# Patient Record
Sex: MALE | Race: WHITE | NOT HISPANIC OR LATINO | URBAN - METROPOLITAN AREA
[De-identification: names, ages, dates, MRNs, and addresses within clinical notes are randomized per-mention and may not be internally consistent; named-entity substitution may affect disease eponyms.]

---

## 2017-03-21 ENCOUNTER — EMERGENCY (EMERGENCY)
Facility: HOSPITAL | Age: 52
LOS: 1 days | End: 2017-03-21
Attending: EMERGENCY MEDICINE | Admitting: EMERGENCY MEDICINE
Payer: COMMERCIAL

## 2017-03-21 VITALS
RESPIRATION RATE: 16 BRPM | HEIGHT: 75 IN | OXYGEN SATURATION: 98 % | WEIGHT: 315 LBS | DIASTOLIC BLOOD PRESSURE: 100 MMHG | TEMPERATURE: 98 F | SYSTOLIC BLOOD PRESSURE: 149 MMHG | HEART RATE: 92 BPM

## 2017-03-21 VITALS
OXYGEN SATURATION: 97 % | TEMPERATURE: 98 F | DIASTOLIC BLOOD PRESSURE: 96 MMHG | RESPIRATION RATE: 15 BRPM | HEART RATE: 88 BPM | SYSTOLIC BLOOD PRESSURE: 143 MMHG

## 2017-03-21 PROCEDURE — 99284 EMERGENCY DEPT VISIT MOD MDM: CPT | Mod: 25

## 2017-03-21 PROCEDURE — 70450 CT HEAD/BRAIN W/O DYE: CPT

## 2017-03-21 PROCEDURE — 70450 CT HEAD/BRAIN W/O DYE: CPT | Mod: 26

## 2017-03-21 PROCEDURE — 99284 EMERGENCY DEPT VISIT MOD MDM: CPT

## 2017-03-21 RX ORDER — ACETAMINOPHEN 500 MG
650 TABLET ORAL ONCE
Qty: 0 | Refills: 0 | Status: COMPLETED | OUTPATIENT
Start: 2017-03-21 | End: 2017-03-21

## 2017-03-21 RX ADMIN — Medication 650 MILLIGRAM(S): at 01:43

## 2017-03-21 NOTE — ED PROVIDER NOTE - LEFT FACE
TENDERNESS TO PALPATION/+soft tissue swelling to top of head, no step off, no laceration or abrasion

## 2017-03-21 NOTE — ED PROVIDER NOTE - OBJECTIVE STATEMENT
52yo male who presenst with head injury at work. pt was bending down to  a box and stood up and hit his head, no LOC, pt states he "saw stars" no dizziness or vomiting, +headache and pain, pt did not take anything for the pain

## 2017-03-21 NOTE — ED ADULT NURSE NOTE - CHPI ED SYMPTOMS NEG
no change in level of consciousness/no vomiting/no syncope/no confusion/no dizziness/no nausea/no weakness/no loss of consciousness/no blurred vision

## 2017-03-21 NOTE — ED PROVIDER NOTE - CHPI ED SYMPTOMS NEG
no vomiting/no weakness/no confusion/no numbness/no blurred vision/no change in level of consciousness/no loss of consciousness

## 2018-02-16 ENCOUNTER — EMERGENCY (EMERGENCY)
Facility: HOSPITAL | Age: 53
LOS: 1 days | Discharge: HOME | End: 2018-02-16
Attending: EMERGENCY MEDICINE | Admitting: EMERGENCY MEDICINE
Payer: COMMERCIAL

## 2018-02-16 VITALS
WEIGHT: 315 LBS | HEART RATE: 90 BPM | DIASTOLIC BLOOD PRESSURE: 94 MMHG | HEIGHT: 74 IN | OXYGEN SATURATION: 98 % | SYSTOLIC BLOOD PRESSURE: 136 MMHG | RESPIRATION RATE: 16 BRPM | TEMPERATURE: 98 F

## 2018-02-16 VITALS
SYSTOLIC BLOOD PRESSURE: 135 MMHG | RESPIRATION RATE: 17 BRPM | HEART RATE: 97 BPM | TEMPERATURE: 98 F | DIASTOLIC BLOOD PRESSURE: 88 MMHG | OXYGEN SATURATION: 95 %

## 2018-02-16 LAB
FLUAV SPEC QL CULT: NEGATIVE — SIGNIFICANT CHANGE UP
FLUBV AG SPEC QL IA: NEGATIVE — SIGNIFICANT CHANGE UP

## 2018-02-16 PROCEDURE — 87633 RESP VIRUS 12-25 TARGETS: CPT

## 2018-02-16 PROCEDURE — 71046 X-RAY EXAM CHEST 2 VIEWS: CPT | Mod: 26

## 2018-02-16 PROCEDURE — 99284 EMERGENCY DEPT VISIT MOD MDM: CPT

## 2018-02-16 PROCEDURE — 99283 EMERGENCY DEPT VISIT LOW MDM: CPT | Mod: 25

## 2018-02-16 PROCEDURE — 71045 X-RAY EXAM CHEST 1 VIEW: CPT | Mod: 26

## 2018-02-16 PROCEDURE — 71045 X-RAY EXAM CHEST 1 VIEW: CPT

## 2018-02-16 PROCEDURE — 71046 X-RAY EXAM CHEST 2 VIEWS: CPT

## 2018-02-16 PROCEDURE — 87400 INFLUENZA A/B EACH AG IA: CPT

## 2018-02-16 RX ADMIN — Medication 75 MILLIGRAM(S): at 01:45

## 2018-02-16 NOTE — ED ADULT NURSE NOTE - OBJECTIVE STATEMENT
Patient presents to ED with URI like symptoms that began today. As per patient has nasal congestion, cough and periods of diaphoresis. At present denies any pain but reports chest congestion. Denies nausea, vomiting or diarrhea.

## 2018-02-16 NOTE — ED PROVIDER NOTE - MEDICAL DECISION MAKING DETAILS
52 y.o. smoker c/o 2d URI symptoms, no fever, started getting sweaty tonight at work, more congestion, prelim flu negative, final pending, cxr appears negative, official read pending, starting tamiflu now, pt to f/u final results today

## 2018-02-16 NOTE — ED PROVIDER NOTE - OBJECTIVE STATEMENT
52 y.o. M noted cough, congestion since about 1.5d ago 52 y.o. M noted cough, congestion since about 1.5d ago, no fever, + cough, no sob, here in hospital working tonight, starting to sweat, thinks has the flu

## 2018-02-16 NOTE — ED PROVIDER NOTE - ENMT, MLM
Airway patent, Nasal mucosa clear. Mouth with normal mucosa. Throat has no vesicles, no oropharyngeal exudates and uvula is midline. no sinus ttp

## 2020-04-25 ENCOUNTER — MESSAGE (OUTPATIENT)
Age: 55
End: 2020-04-25

## 2020-05-15 ENCOUNTER — APPOINTMENT (OUTPATIENT)
Dept: DISASTER EMERGENCY | Facility: HOSPITAL | Age: 55
End: 2020-05-15

## 2020-05-16 LAB
SARS-COV-2 IGG SERPL IA-ACNC: 0.1 INDEX
SARS-COV-2 IGG SERPL QL IA: NEGATIVE

## 2020-09-05 ENCOUNTER — EMERGENCY (EMERGENCY)
Facility: HOSPITAL | Age: 55
LOS: 1 days | Discharge: HOME | End: 2020-09-05
Attending: EMERGENCY MEDICINE
Payer: COMMERCIAL

## 2020-09-05 VITALS
TEMPERATURE: 98 F | HEART RATE: 93 BPM | SYSTOLIC BLOOD PRESSURE: 138 MMHG | DIASTOLIC BLOOD PRESSURE: 92 MMHG | OXYGEN SATURATION: 94 % | RESPIRATION RATE: 18 BRPM

## 2020-09-05 PROCEDURE — 73030 X-RAY EXAM OF SHOULDER: CPT | Mod: 26,RT

## 2020-09-05 PROCEDURE — 99284 EMERGENCY DEPT VISIT MOD MDM: CPT

## 2020-09-05 PROCEDURE — 73060 X-RAY EXAM OF HUMERUS: CPT

## 2020-09-05 PROCEDURE — 73030 X-RAY EXAM OF SHOULDER: CPT

## 2020-09-05 PROCEDURE — 73060 X-RAY EXAM OF HUMERUS: CPT | Mod: 26,RT

## 2020-09-05 RX ORDER — IBUPROFEN 200 MG
600 TABLET ORAL ONCE
Refills: 0 | Status: COMPLETED | OUTPATIENT
Start: 2020-09-05 | End: 2020-09-05

## 2020-09-05 RX ORDER — DIAZEPAM 5 MG
5 TABLET ORAL ONCE
Refills: 0 | Status: DISCONTINUED | OUTPATIENT
Start: 2020-09-05 | End: 2020-09-05

## 2020-09-05 RX ORDER — METHOCARBAMOL 500 MG/1
1 TABLET, FILM COATED ORAL
Qty: 6 | Refills: 0
Start: 2020-09-05 | End: 2020-09-07

## 2020-09-05 RX ADMIN — Medication 5 MILLIGRAM(S): at 18:20

## 2020-09-05 RX ADMIN — Medication 600 MILLIGRAM(S): at 18:19

## 2020-09-05 NOTE — ED PROVIDER NOTE - NSFOLLOWUPINSTRUCTIONS_ED_ALL_ED_FT
Follow up with an orthopedist within one week.  Keep splint on. Continue to apply ice.  Take medications as prescribed. Do not drive while taking muscle relaxer. Can continue to take Tylenol and Motrin as directed for pain.   Return immediately for any worsening or concerning symptoms.

## 2020-09-05 NOTE — ED PROVIDER NOTE - OBJECTIVE STATEMENT
55 yr old male, pmhx of DM, presents with right shoulder pain just prior to arrival. Patient s/p mechanical trip and fall and landed on right shoulder. Patient did not hit head, no LOC. Patient states only pain to right shoulder. No neck or back pain. No numbness/tingling.

## 2020-09-05 NOTE — ED PROVIDER NOTE - PATIENT PORTAL LINK FT
You can access the FollowMyHealth Patient Portal offered by  by registering at the following website: [email or web address]. By joining [facility]’s FollowMyHealth portal, you will also be able to view your health information using other applications (apps) compatible with our system.

## 2020-09-05 NOTE — ED PROVIDER NOTE - CARE PROVIDER_API CALL
Agus Damian (DO)  Orthopedics  [address] [place] [id number]  Phone: [phone]  Fax: [phone]  Follow Up Time: 4-6 Days

## 2020-09-05 NOTE — ED PROVIDER NOTE - ATTENDING CONTRIBUTION TO CARE
Shayne WHITMAN- 56 Y/O M p/w fall after tripping on a tree branch at home and fell on his rt shoulder, no hit to the head or loc. Pt is unable to move his rt shoulder much and brought in by ems in sling. Pt is rt handed    pt is alert,  obese, large built, well appearing male, s1s2 normaql reg, b/l clear breath sounds, abd soft, nt, nd, normal bowel sounds,  rt shoulder swollen, diffusely tender but no deformity, normal axillary sensation, motion at elbow and wrist, skin warm, dry, good turgor    plan to r/o shoulder fx , control pain and reassess

## 2020-09-05 NOTE — ED PROVIDER NOTE - MUSCULOSKELETAL MINIMAL EXAM
right shoulder with decrease in motion overhead, FROM  to right elbow and wrist, good cap refill, intact sensation throughout

## 2020-12-31 ENCOUNTER — APPOINTMENT (OUTPATIENT)
Dept: ORTHOPEDIC SURGERY | Facility: CLINIC | Age: 55
End: 2020-12-31
Payer: COMMERCIAL

## 2020-12-31 VITALS
WEIGHT: 315 LBS | BODY MASS INDEX: 40.43 KG/M2 | HEIGHT: 74 IN | TEMPERATURE: 96.1 F | DIASTOLIC BLOOD PRESSURE: 94 MMHG | HEART RATE: 86 BPM | SYSTOLIC BLOOD PRESSURE: 144 MMHG

## 2020-12-31 PROCEDURE — 99203 OFFICE O/P NEW LOW 30 MIN: CPT

## 2020-12-31 PROCEDURE — 99072 ADDL SUPL MATRL&STAF TM PHE: CPT

## 2020-12-31 NOTE — HISTORY OF PRESENT ILLNESS
[de-identified] : Nixon is a pleasant left-hand-dominant 55-year-old obese male who presents to the office today complaining of right shoulder pain and weakness.  He states that he had an injury in September 2020 after a fall and has had pain and weakness in his shoulder ever since.  He sought treatment with another orthopedist who recommended surgical intervention for a full-thickness rotator cuff injury.  He was scheduled for surgery twice but had canceled on both times related to clearance issues as well as his body habitus.  He comes in today seeking a second opinion.  He has had difficulty working because of the pain.  He does housekeeping at [employer] and is unable to keep up with his duties because of this pain.  Takes anti-inflammatories as needed but they are not helpful.  The patient denies any fevers, chills, sweats, recent illnesses, numbness, tingling, or pain elsewhere at this time.

## 2020-12-31 NOTE — DISCUSSION/SUMMARY
[de-identified] : Assessment: 55-year-old male with right shoulder pain secondary to rotator cuff injury involving his supraspinatus, infraspinatus, and subscapularis tendons, biceps tendinopathy, subacromial impingement, AC joint arthrosis, and synovitis\par \par Plan: I had a long discussion with the patient today regarding the nature of their diagnosis and treatment plan.  We discussed the risks and benefits of no treatment as well as nonoperative and operative treatments.  I reviewed the patient's imaging with them today in the office which demonstrates full-thickness tearing of the supraspinatus, tendinosis of the infraspinatus and subscapularis, biceps tendinopathy, AC joint arthrosis, subacromial impingement, and synovitis. The patient has failed conservative treatment measures including rest, ice, heat, anti-inflammatory medications, activity modifications, and home exercises/physical therapy.  They continue to have significant pain and functional limitations which interfere with their daily activities. At this time they would like to consider operative intervention as a more definitive treatment option for their symptoms.  Surgical treatment would include a right shoulder arthroscopic rotator cuff repair with subacromial decompression, possible distal clavicle excision, synovectomy, and possible biceps tenotomy.  The risks and benefits of surgery were discussed in detail.  The benefits include improved shoulder pain and function.  The risks include but are not limited to infection, blood loss, blood clots, neurovascular injury, stiffness/loss of range of motion, fracture, failure of repair, persistent pain, anesthesia related complications including paralysis and death, and the need for further surgery in the future.  I explained to the patient that they will be nonweightbearing of their operative extremity in a shoulder immobilizer for a minimum of 6 weeks postoperatively.  They will not be permitted to drive while wearing the sling and/or while taking narcotic pain medications.  I also informed the patient that they will need to actively participate in physical therapy 2-3 days a week for a minimum of 4 to 6 months postoperatively in order to optimize their surgical outcome.  I expect most patients to make a full recovery at an average of 6 months postoperatively, but some patients take longer to recover and can continue to make improvements up to 1 year after surgery.  The patient verbalizes their understanding of all risks and comprehends the post-operative course and plan of care. They would like to proceed with surgery in the near future and will speak with my surgical coordinator regarding a date and time for the procedure.  Because of the patient's BMI this procedure will likely need to be done at the hospital.  We will also need to request a larger beanbag or lateral positioner given his size.  All questions were answered to her satisfaction.

## 2020-12-31 NOTE — REASON FOR VISIT
[Initial Visit] : an initial visit for [Shoulder Pain] : shoulder pain [FreeTextEntry2] : Right shoulder pain

## 2020-12-31 NOTE — PHYSICAL EXAM
[de-identified] : General:\par Awake, alert, no acute distress, Patient was cooperative and appropriate during the examination.\par \par The patient is morbidly obese for height and age.\par \par Walks without an antalgic gait.\par \par Full, painless range of motion of the neck and back.\par \par Exam of the bilateral lower extremities is intact and symmetric with regards to dermatologic, vascular, and neurologic exam. Bilateral lower extremity sensation is grossly intact to light touch in the DP/SP/T/S/S nerve distributions. Intact DF/PF/EHL. BIlateral lower extremities warm and well-perfused with brisk capillary refill.\par \par \par Pulmonary:\par Regular, nonlabored breathing\par \par Abdomen:\par Soft, nontender, nondistended.\par \par Lymphatic:\par No evidence of inguinal lymphadenopathy\par \par Right shoulder Exam:\par Physical exam of the shoulder demonstrates normal skin without signs of skin changes or abnormalities. No erythema, warmth, or joint effusion appreciated.  \par \par Sensation intact light touch C5-T1\par Palpable radial pulse\par Radial/ulnar/median/axillary/musculocutaneous/AIN/PIN nerves grossly intact\par \par Range of motion:\par Forward Flexion: 160 limited by pain\par Abduction: 150\par External Rotation: 45\par Internal Rotation: Beltline limited by pain\par \par Palpation:\par Not tender to palpation over the glenohumeral joint\par Exquisitely tender palpation over the rotator cuff insertion on the greater tuberosity\par Moderately tender to palpation over the AC joint\par Moderately tender to palpation of the biceps tendon/bicipital groove\par \par Strength testing:\par Supraspinatus: 4/5\par Infraspinatus: 4/5\par Subscapularis: 4+/5\par \par Special test:\par Empty can test positive\par Gonzalez impingement test positive\par Speeds test positive\par Hamilton's test negative\par Lift-off test negative\par Bell-press test negative\par Cross-arm adduction test equivocal limited by global shoulder pain\par \par  [de-identified] : MRI of the right shoulder [facility] from September 2020 was reviewed with the patient today in the office.  Patient has a full-thickness retracted tear of the supraspinatus tendon near the critical zone.  There is subscapularis and infraspinatus tendinosis with moderate to high-grade partial-thickness tearing of the articular surface of both tendons.  Patient has subacromial impingement with severe subacromial and subdeltoid bursitis.  Minimal muscular atrophy.  The patient has moderate AC joint arthrosis and osteophyte formation as well as a glenohumeral joint effusion and synovitis.  There is also significant tendinopathy of the biceps tendon.

## 2021-01-05 ENCOUNTER — TRANSCRIPTION ENCOUNTER (OUTPATIENT)
Age: 56
End: 2021-01-05

## 2021-01-06 ENCOUNTER — OUTPATIENT (OUTPATIENT)
Dept: OUTPATIENT SERVICES | Facility: HOSPITAL | Age: 56
LOS: 1 days | End: 2021-01-06
Payer: COMMERCIAL

## 2021-01-06 VITALS
TEMPERATURE: 98 F | WEIGHT: 315 LBS | RESPIRATION RATE: 16 BRPM | HEART RATE: 94 BPM | DIASTOLIC BLOOD PRESSURE: 80 MMHG | HEIGHT: 74 IN | SYSTOLIC BLOOD PRESSURE: 112 MMHG

## 2021-01-06 DIAGNOSIS — E11.9 TYPE 2 DIABETES MELLITUS WITHOUT COMPLICATIONS: ICD-10-CM

## 2021-01-06 DIAGNOSIS — Z29.9 ENCOUNTER FOR PROPHYLACTIC MEASURES, UNSPECIFIED: ICD-10-CM

## 2021-01-06 DIAGNOSIS — M25.511 PAIN IN RIGHT SHOULDER: ICD-10-CM

## 2021-01-06 DIAGNOSIS — Z01.818 ENCOUNTER FOR OTHER PREPROCEDURAL EXAMINATION: ICD-10-CM

## 2021-01-06 DIAGNOSIS — I10 ESSENTIAL (PRIMARY) HYPERTENSION: ICD-10-CM

## 2021-01-06 DIAGNOSIS — Z71.89 OTHER SPECIFIED COUNSELING: ICD-10-CM

## 2021-01-06 LAB
A1C WITH ESTIMATED AVERAGE GLUCOSE RESULT: 6.5 % — HIGH (ref 4–5.6)
ALBUMIN SERPL ELPH-MCNC: 4 G/DL — SIGNIFICANT CHANGE UP (ref 3.3–5.2)
ALP SERPL-CCNC: 75 U/L — SIGNIFICANT CHANGE UP (ref 40–120)
ALT FLD-CCNC: 20 U/L — SIGNIFICANT CHANGE UP
ANION GAP SERPL CALC-SCNC: 9 MMOL/L — SIGNIFICANT CHANGE UP (ref 5–17)
APTT BLD: 33 SEC — SIGNIFICANT CHANGE UP (ref 27.5–35.5)
AST SERPL-CCNC: 19 U/L — SIGNIFICANT CHANGE UP
BASOPHILS # BLD AUTO: 0.1 K/UL — SIGNIFICANT CHANGE UP (ref 0–0.2)
BASOPHILS NFR BLD AUTO: 0.8 % — SIGNIFICANT CHANGE UP (ref 0–2)
BILIRUB SERPL-MCNC: 0.3 MG/DL — LOW (ref 0.4–2)
BUN SERPL-MCNC: 17 MG/DL — SIGNIFICANT CHANGE UP (ref 8–20)
CALCIUM SERPL-MCNC: 9.2 MG/DL — SIGNIFICANT CHANGE UP (ref 8.6–10.2)
CHLORIDE SERPL-SCNC: 106 MMOL/L — SIGNIFICANT CHANGE UP (ref 98–107)
CO2 SERPL-SCNC: 24 MMOL/L — SIGNIFICANT CHANGE UP (ref 22–29)
CREAT SERPL-MCNC: 0.86 MG/DL — SIGNIFICANT CHANGE UP (ref 0.5–1.3)
EOSINOPHIL # BLD AUTO: 0.3 K/UL — SIGNIFICANT CHANGE UP (ref 0–0.5)
EOSINOPHIL NFR BLD AUTO: 2.4 % — SIGNIFICANT CHANGE UP (ref 0–6)
ESTIMATED AVERAGE GLUCOSE: 140 MG/DL — HIGH (ref 68–114)
GLUCOSE SERPL-MCNC: 80 MG/DL — SIGNIFICANT CHANGE UP (ref 70–99)
HCT VFR BLD CALC: 51.5 % — HIGH (ref 39–50)
HGB BLD-MCNC: 16.7 G/DL — SIGNIFICANT CHANGE UP (ref 13–17)
IMM GRANULOCYTES NFR BLD AUTO: 0.4 % — SIGNIFICANT CHANGE UP (ref 0–1.5)
INR BLD: 1.04 RATIO — SIGNIFICANT CHANGE UP (ref 0.88–1.16)
LYMPHOCYTES # BLD AUTO: 29.8 % — SIGNIFICANT CHANGE UP (ref 13–44)
LYMPHOCYTES # BLD AUTO: 3.74 K/UL — HIGH (ref 1–3.3)
MCHC RBC-ENTMCNC: 28.3 PG — SIGNIFICANT CHANGE UP (ref 27–34)
MCHC RBC-ENTMCNC: 32.4 GM/DL — SIGNIFICANT CHANGE UP (ref 32–36)
MCV RBC AUTO: 87.3 FL — SIGNIFICANT CHANGE UP (ref 80–100)
MONOCYTES # BLD AUTO: 1.06 K/UL — HIGH (ref 0–0.9)
MONOCYTES NFR BLD AUTO: 8.4 % — SIGNIFICANT CHANGE UP (ref 2–14)
NEUTROPHILS # BLD AUTO: 7.32 K/UL — SIGNIFICANT CHANGE UP (ref 1.8–7.4)
NEUTROPHILS NFR BLD AUTO: 58.2 % — SIGNIFICANT CHANGE UP (ref 43–77)
PLATELET # BLD AUTO: 209 K/UL — SIGNIFICANT CHANGE UP (ref 150–400)
POTASSIUM SERPL-MCNC: 4.5 MMOL/L — SIGNIFICANT CHANGE UP (ref 3.5–5.3)
POTASSIUM SERPL-SCNC: 4.5 MMOL/L — SIGNIFICANT CHANGE UP (ref 3.5–5.3)
PROT SERPL-MCNC: 7.5 G/DL — SIGNIFICANT CHANGE UP (ref 6.6–8.7)
PROTHROM AB SERPL-ACNC: 12 SEC — SIGNIFICANT CHANGE UP (ref 10.6–13.6)
RBC # BLD: 5.9 M/UL — HIGH (ref 4.2–5.8)
RBC # FLD: 14.9 % — HIGH (ref 10.3–14.5)
SODIUM SERPL-SCNC: 139 MMOL/L — SIGNIFICANT CHANGE UP (ref 135–145)
WBC # BLD: 12.57 K/UL — HIGH (ref 3.8–10.5)
WBC # FLD AUTO: 12.57 K/UL — HIGH (ref 3.8–10.5)

## 2021-01-06 PROCEDURE — 85025 COMPLETE CBC W/AUTO DIFF WBC: CPT

## 2021-01-06 PROCEDURE — 36415 COLL VENOUS BLD VENIPUNCTURE: CPT

## 2021-01-06 PROCEDURE — G0463: CPT

## 2021-01-06 PROCEDURE — 85730 THROMBOPLASTIN TIME PARTIAL: CPT

## 2021-01-06 PROCEDURE — 80053 COMPREHEN METABOLIC PANEL: CPT

## 2021-01-06 PROCEDURE — 83036 HEMOGLOBIN GLYCOSYLATED A1C: CPT

## 2021-01-06 PROCEDURE — 93005 ELECTROCARDIOGRAM TRACING: CPT

## 2021-01-06 PROCEDURE — 93010 ELECTROCARDIOGRAM REPORT: CPT

## 2021-01-06 PROCEDURE — 85610 PROTHROMBIN TIME: CPT

## 2021-01-06 RX ORDER — SODIUM CHLORIDE 9 MG/ML
3 INJECTION INTRAMUSCULAR; INTRAVENOUS; SUBCUTANEOUS ONCE
Refills: 0 | Status: DISCONTINUED | OUTPATIENT
Start: 2021-01-19 | End: 2021-02-02

## 2021-01-06 NOTE — H&P PST ADULT - EKG AND INTERPRETATION
EKG reviewed personally Rate =  91 Bpm   finding   normal sinus possible left atrial enlargement  official reading

## 2021-01-06 NOTE — H&P PST ADULT - HISTORY OF PRESENT ILLNESS
pre op dx: pain in the right shoulder )  Patient is a 56 y/o male aox3 . Patient c/o right should pain 5/10 to 10/10 worse with movement no relief from rest . Patient was cleaning his yard up after a storm on labor day and  tripped over a branch landed on his right shoulder ,patient was unable to get up due to pain . Patient was taken to the hospital and had an evaluation ,patient followed up with PCP was sent for a MRI as per patient his findings of a right rotator cuff tear and  a bicep tendon tear . Patient presents to PST for evaluation for a right shoulder arthroscopic cuff repair subacromial decompression synovectomy possible bicep tenotomy versus open subpectoral bicep tenodesis on 01/19/21 with Dr Damian

## 2021-01-06 NOTE — H&P PST ADULT - NSICDXPROBLEM_GEN_ALL_CORE_FT
PROBLEM DIAGNOSES  Problem: Need for prophylactic measure  Assessment and Plan: caprini is 4 moderate VTE risk SCD's ordered surgical team to assess the need for pharm prop        PROBLEM DIAGNOSES  Problem: Pain in right shoulder  Assessment and Plan: pt is having a right shoulder arthroscopic roatator cuff repair subacromial decompression synovetomy. possible bicep tenotomy versus open subpectoralbicep tendesis on 1/19/21     Problem: HTN (hypertension)  Assessment and Plan: pt will continue medication and monitor B/P     Problem: Diabetes  Assessment and Plan: pt will continue medication and moitor blood glucose and s/s of hypoglycimia     Problem: Educated about COVID-19 virus infection  Assessment and Plan: educated on testing and prevention     Problem: Need for prophylactic measure  Assessment and Plan: caprini is 4 moderate VTE risk SCD's ordered surgical team to assess the need for pharm prop

## 2021-01-06 NOTE — H&P PST ADULT - MUSCULOSKELETAL
normal/ROM intact/no joint swelling/no joint erythema/no joint warmth/no calf tenderness details… detailed exam

## 2021-01-06 NOTE — H&P PST ADULT - NSICDXPASTMEDICALHX_GEN_ALL_CORE_FT
PAST MEDICAL HISTORY:  Diabetes on medication follow upo with PCP    HTN (hypertension) on medication and B/P ic controled B/P 112/80    Obstructive Sleep Apnea not compliant with CPAP

## 2021-01-06 NOTE — H&P PST ADULT - OTHER CARE PROVIDERS
Dr Romo [facility] [phone] Dr Romo [facility] health [phone]

## 2021-01-06 NOTE — H&P PST ADULT - ASSESSMENT
OPIOID RISK TOOL    RAMIRO EACH BOX THAT APPLIES AND ADD TOTALS AT THE END    FAMILY HISTORY OF SUBSTANCE ABUSE                 FEMALE         MALE                                                Alcohol                             [  ]1 pt          [  ]3pts                                               Illegal Drugs                     [  ]2 pts        [  ]3pts                                               Rx Drugs                           [  ]4 pts        [  ]4 pts    PERSONAL HISTORY OF SUBSTANCE ABUSE                                                                                          Alcohol                             [  ]3 pts       [  ]3 pts                                               Illegal Drugs                     [  ]4 pts        [  ]4 pts                                               Rx Drugs                           [  ]5 pts        [  ]5 pts    AGE BETWEEN 16-45 YEARS                                      [  ]1 pt         [  ]1 pt    HISTORY OF PREADOLESCENT   SEXUAL ABUSE                                                             [  ]3 pts        [  ]0pts    PSYCHOLOGICAL DISEASE                     ADD, OCD, Bipolar, Schizophrenia        [  ]2 pts         [  ]2 pts                      Depression                                               [  ]1 pt           [  ]1 pt           SCORING TOTAL   (add numbers and type here)              (*0**)                                     A score of 3 or lower indicated LOW risk for future opioid abuse  A score of 4 to 7 indicated moderate risk for future opioid abuse  A score of 8 or higher indicates a high risk for opioid abuse      CAPRINI SCORE [CLOT]    AGE RELATED RISK FACTORS                                                       MOBILITY RELATED FACTORS  [x ] Age 41-60 years                                            (1 Point)                  [ ] Bed rest                                                        (1 Point)  [ ] Age: 61-74 years                                           (2 Points)                 [ ] Plaster cast                                                   (2 Points)  [ ] Age= 75 years                                              (3 Points)                 [ ] Bed bound for more than 72 hours                 (2 Points)    DISEASE RELATED RISK FACTORS                                               GENDER SPECIFIC FACTORS  [ ] Edema in the lower extremities                       (1 Point)                  [ ] Pregnancy                                                     (1 Point)  [ ] Varicose veins                                               (1 Point)                  [ ] Post-partum < 6 weeks                                   (1 Point)             [ xx] BMI > 25 Kg/m2                                            (1 Point)                  [ ] Hormonal therapy  or oral contraception          (1 Point)                 [ ] Sepsis (in the previous month)                        (1 Point)                  [ ] History of pregnancy complications                 (1 point)  [ ] Pneumonia or serious lung disease                                               [ ] Unexplained or recurrent                     (1 Point)           (in the previous month)                               (1 Point)  [ ] Abnormal pulmonary function test                     (1 Point)                 SURGERY RELATED RISK FACTORS  [ ] Acute myocardial infarction                              (1 Point)                 [ ]  Section                                             (1 Point)  [ ] Congestive heart failure (in the previous month)  (1 Point)               [ ] Minor surgery                                                  (1 Point)   [ ] Inflammatory bowel disease                             (1 Point)                 [ ] Arthroscopic surgery                                        (2 Points)  [ ] Central venous access                                      (2 Points)                [ x] General surgery lasting more than 45 minutes   (2 Points)       [ ] Stroke (in the previous month)                          (5 Points)               [ ] Elective arthroplasty                                         (5 Points)                                                                                                                                               HEMATOLOGY RELATED FACTORS                                                 TRAUMA RELATED RISK FACTORS  [ ] Prior episodes of VTE                                     (3 Points)                [ ] Fracture of the hip, pelvis, or leg                       (5 Points)  [ ] Positive family history for VTE                         (3 Points)                 [ ] Acute spinal cord injury (in the previous month)  (5 Points)  [ ] Prothrombin 46395 A                                     (3 Points)                 [ ] Paralysis  (less than 1 month)                             (5 Points)  [ ] Factor V Leiden                                             (3 Points)                  [ ] Multiple Trauma within 1 month                        (5 Points)  [ ] Lupus anticoagulants                                     (3 Points)                                                           [ ] Anticardiolipin antibodies                               (3 Points)                                                       [ ] High homocysteine in the blood                      (3 Points)                                             [ ] Other congenital or acquired thrombophilia      (3 Points)                                                [ ] Heparin induced thrombocytopenia                  (3 Points)                                          Total Score [      4    ]    Caprini Score 0 - 2:  Low Risk, No VTE Prophylaxis required for most patients, encourage ambulation  Caprini Score 3 - 6:  At Risk, pharmacologic VTE prophylaxis is indicated for most patients (in the absence of a contraindication)  Caprini Score Greater than or = 7:  High Risk, pharmacologic VTE prophylaxis is indicated for most patients (in the absence of a contraindication)    Patient is a 54 y/o male aox3 . Patient c/o right should pain 5/10 to 10/10 worse with movement no relief from rest . Patient was cleaning his yard up after a storm on labor day and  tripped over a branch landed on his right shoulder ,patient was unable to get up due to pain . Patient was taken to the hospital and had an evaluation ,patient followed up with PCP was sent for a MRI as per patient his findings of a right rotator cuff tear and  a bicep tendon tear . Patient presents to [facility] for evaluation for a right shoulder arthroscopic cuff repair subacromial decompression synovectomy possible bicep tenotomy versus open subpectoral bicep tenodesis on 21 with Dr Damian    Patient was educated on preoperative preparation with written and verbal instruction . Patient is going for medical clearance with DR Chong [phone]  . Patient will review medications with PCP. Patient was educated on aspirin and aspirin products NSAIDS ,vitamins and herbals that increase the risk of bleeding and need to be stopped five days before procedure  . Patient was also educated on covid testing and covid prevention ,social distancing and wearing a mask.

## 2021-01-15 DIAGNOSIS — Z01.818 ENCOUNTER FOR OTHER PREPROCEDURAL EXAMINATION: ICD-10-CM

## 2021-01-16 ENCOUNTER — APPOINTMENT (OUTPATIENT)
Dept: DISASTER EMERGENCY | Facility: CLINIC | Age: 56
End: 2021-01-16

## 2021-01-18 ENCOUNTER — TRANSCRIPTION ENCOUNTER (OUTPATIENT)
Age: 56
End: 2021-01-18

## 2021-01-18 LAB — SARS-COV-2 N GENE NPH QL NAA+PROBE: NOT DETECTED

## 2021-01-19 ENCOUNTER — OUTPATIENT (OUTPATIENT)
Dept: INPATIENT UNIT | Facility: HOSPITAL | Age: 56
LOS: 1 days | End: 2021-01-19
Payer: COMMERCIAL

## 2021-01-19 ENCOUNTER — APPOINTMENT (OUTPATIENT)
Dept: ORTHOPEDIC SURGERY | Facility: HOSPITAL | Age: 56
End: 2021-01-19

## 2021-01-19 VITALS
RESPIRATION RATE: 20 BRPM | TEMPERATURE: 97 F | OXYGEN SATURATION: 97 % | SYSTOLIC BLOOD PRESSURE: 132 MMHG | HEART RATE: 82 BPM | DIASTOLIC BLOOD PRESSURE: 70 MMHG

## 2021-01-19 VITALS
HEART RATE: 92 BPM | RESPIRATION RATE: 16 BRPM | DIASTOLIC BLOOD PRESSURE: 97 MMHG | WEIGHT: 315 LBS | HEIGHT: 74 IN | SYSTOLIC BLOOD PRESSURE: 140 MMHG | TEMPERATURE: 97 F | OXYGEN SATURATION: 96 %

## 2021-01-19 DIAGNOSIS — M25.511 PAIN IN RIGHT SHOULDER: ICD-10-CM

## 2021-01-19 LAB
BLD GP AB SCN SERPL QL: SIGNIFICANT CHANGE UP
GLUCOSE BLDC GLUCOMTR-MCNC: 136 MG/DL — HIGH (ref 70–99)

## 2021-01-19 PROCEDURE — 29826 SHO ARTHRS SRG DECOMPRESSION: CPT | Mod: RT

## 2021-01-19 PROCEDURE — 29823 SHO ARTHRS SRG XTNSV DBRDMT: CPT | Mod: AS,RT

## 2021-01-19 PROCEDURE — 29826 SHO ARTHRS SRG DECOMPRESSION: CPT | Mod: AS,RT

## 2021-01-19 PROCEDURE — 86901 BLOOD TYPING SEROLOGIC RH(D): CPT

## 2021-01-19 PROCEDURE — 86850 RBC ANTIBODY SCREEN: CPT

## 2021-01-19 PROCEDURE — 29827 SHO ARTHRS SRG RT8TR CUF RPR: CPT | Mod: RT

## 2021-01-19 PROCEDURE — 29827 SHO ARTHRS SRG RT8TR CUF RPR: CPT | Mod: AS,RT

## 2021-01-19 PROCEDURE — 29823 SHO ARTHRS SRG XTNSV DBRDMT: CPT | Mod: RT

## 2021-01-19 PROCEDURE — 29824 SHO ARTHRS SRG DSTL CLAVICLC: CPT | Mod: RT

## 2021-01-19 PROCEDURE — C1713: CPT

## 2021-01-19 PROCEDURE — 36415 COLL VENOUS BLD VENIPUNCTURE: CPT

## 2021-01-19 PROCEDURE — 82962 GLUCOSE BLOOD TEST: CPT

## 2021-01-19 PROCEDURE — 29824 SHO ARTHRS SRG DSTL CLAVICLC: CPT | Mod: AS,RT

## 2021-01-19 PROCEDURE — 86900 BLOOD TYPING SEROLOGIC ABO: CPT

## 2021-01-19 RX ORDER — ONDANSETRON 8 MG/1
4 TABLET, FILM COATED ORAL EVERY 6 HOURS
Refills: 0 | Status: DISCONTINUED | OUTPATIENT
Start: 2021-01-19 | End: 2021-02-02

## 2021-01-19 RX ORDER — OXYCODONE AND ACETAMINOPHEN 5; 325 MG/1; MG/1
1 TABLET ORAL
Qty: 42 | Refills: 0
Start: 2021-01-19 | End: 2021-01-25

## 2021-01-19 RX ORDER — SODIUM CHLORIDE 9 MG/ML
1000 INJECTION, SOLUTION INTRAVENOUS
Refills: 0 | Status: DISCONTINUED | OUTPATIENT
Start: 2021-01-19 | End: 2021-01-19

## 2021-01-19 NOTE — BRIEF OPERATIVE NOTE - NSICDXBRIEFPROCEDURE_GEN_ALL_CORE_FT
PROCEDURES:  Tenotomy, biceps 19-Jan-2021 16:07:06  Agus Damian  Arthroscopic repair of right rotator cuff 19-Jan-2021 16:06:54  Agus Damian

## 2021-01-19 NOTE — ASU DISCHARGE PLAN (ADULT/PEDIATRIC) - ASU DC SPECIAL INSTRUCTIONSFT
SEE POST-OP PROTOCOL PACKET    - Follow-up with Dr. Damian in 1 week  - NON-weight bearing of right arm  - Keep dressings dry  - May remove dressings after 3 days. May place bandaids or similar dressing over suture sites. SEE POST-OP PROTOCOL PACKET    - Follow-up with Dr. Damian in 2 weeks  - NON-weight bearing of right arm  - Keep dressings dry  - May remove dressings after 3 days. May place bandaids or similar dressing over suture sites.

## 2021-01-19 NOTE — ASU PREOP CHECKLIST - AS BP NONINV SITE
Gus Crockett,      Please note that we usually do not treat UTI's without submitting a urine sample.  However it does not look like there is not a nearest [facility] lab or Urgent Care to submit a urine sample near [place], [place]. I have sent a prescription for antibiotic Macrobid to the [facility] pharmacy in [place]. If symptoms are persisting or worsening despite the antibiotic you need to be evaluated in person at the nearest urgent care to you.  For any questions or concerns regarding  your visit/orders/labs please call the nurse's line at [phone].    Thank you,  YESSY Steven    Dysuria with Uncertain Cause (Adult)    The urethra is the tube that allows urine to pass out of the body. In a woman, the urethra is the opening above the vagina. In men, the urethra is the opening on the tip of the penis. Dysuria is the feeling of pain or burning in the urethra when passing urine.  Dysuria can be caused by anything that irritates or inflames the urethra. An infection or chemical irritation can cause this reaction. A bladder infection is the most common cause of dysuria in adults. A urine test can diagnose this. A bladder infection needs antibiotic treatment.  Soaps, lotions, colognes and feminine hygiene products can cause dysuria. So can birth control jellies, creams, and foams. It will go away 1 to 3 days after using these irritants.  Sexually transmitted diseases (STDs) such as chlamydia or gonorrhea can cause dysuria. Your healthcare provider may take a culture sample. Your provider may start you on antibiotic medicine before the culture test returns.  In women who have gone through menopause, dysuria can be from dryness in the lining of the urethra. This can be treated with hormones. Dysuria becomes long-term (chronic) when it lasts for weeks or months. You may need to see a specialist (urologist) to diagnose and treat chronic dysuria.  Home care  These home care tips may help:  · Don't use  [employer]. [address] [place] [address], [place] [address]. All rights  reserved. This information is not intended as a substitute for professional medical care. Always follow your healthcare professional's instructions.        MEDICATION:  NITROFURANTOIN  Nitrofurantoin (brand: Macrodantin, Macrobid, Furadantin) is an antibiotic used for urinary tract infections.  DIRECTIONS FOR USE:  To prevent upset stomach and improve the absorption of this medicine, take this drug with food or milk.  Take this medicine at regular intervals. Take all of the medicine until it is gone, even if you are feeling better. This will ensure that the infection is fully treated.  Do not take within one hour of magnesium-containing antacids.  WHAT TO WATCH FOR:  POSSIBLE SIDE EFFECTS: Nausea, vomiting, diarrhea, loss of appetite, abdominal pain (Take the medicine with food). Headache, dizziness, asthma attacks, vaginal yeast infection or thrush (Contact your doctor if any of these symptoms persist or become severe). Dark yellow to brown urine color (This is harmless). Chest pain, shortness of breath, unusual cough, easy bruising/bleeding, numbness or tingling in the arms or leg , severe muscle weakness, yellowing of the eyes/skin (Contact your doctor or return to this facility).  ALLERGIC REACTION: Rash, itching, swelling, trouble breathing or swallowing (Contact your doctor or return to this facility promptly).  MEDICAL CONDITIONS: Before starting this medicine, be sure your doctor knows if you have any of the following conditions:  · Last month of pregnancy, breastfeeding an infant less than 1 month old  · Less than 1 month of age  · Kidney or liver disease, diabetes  · Lung disease  · Certain genetic conditions (glucose-6-phosphate dehydrogenase deficiency  · Vitamin B deficiency  · Certain nerve conditions (peripheral neuropathy, optic neuritis)  DRUG INTERACTION: Before starting this medicine, be sure your doctor knows if you are taking any of the following:  · Probenecid  · Quinolone antibiotics (e.g.,  right upper arm ciprofloxacin, norfloxacin)  · Live vaccines  · sulfinpyrazone  [NOTE: This information topic may not include all directions, precautions, medical conditions, drug/food interactions, and warnings for this drug. Check with your doctor, nurse, or pharmacist for any questions that you may have.]  © 0551-4580 [employer], [address] [place] [address], [place] [address]. All rights reserved. This information is not intended as a substitute for professional medical care. Always follow your healthcare professional's instructions.

## 2021-01-19 NOTE — BRIEF OPERATIVE NOTE - OPERATION/FINDINGS
right shoulder full-thickness supraspinatus tendon tear, subacromial impingement with a type II acromion, distal clavicle arthrosis, biceps tendinopathy, synovitis, and bursitis

## 2021-01-19 NOTE — ASU DISCHARGE PLAN (ADULT/PEDIATRIC) - PROCEDURE
Right arthroscopic rotator cuff repair Right shoulder arthroscopic rotator cuff repair Right shoulder arthroscopic rotator cuff repair, biceps tenotomy, distal clavicle excision

## 2021-01-19 NOTE — ASU DISCHARGE PLAN (ADULT/PEDIATRIC) - CARE PROVIDER_API CALL
Agus Damian (DO)  Orthopedics  [address] [place] [address]  Phone: [phone]  Fax: [phone]  Follow Up Time:

## 2021-01-22 PROBLEM — E11.9 TYPE 2 DIABETES MELLITUS WITHOUT COMPLICATIONS: Chronic | Status: ACTIVE | Noted: 2020-09-05

## 2021-01-22 PROBLEM — I10 ESSENTIAL (PRIMARY) HYPERTENSION: Chronic | Status: ACTIVE | Noted: 2021-01-06

## 2021-02-04 ENCOUNTER — APPOINTMENT (OUTPATIENT)
Dept: ORTHOPEDIC SURGERY | Facility: CLINIC | Age: 56
End: 2021-02-04
Payer: COMMERCIAL

## 2021-02-04 VITALS — TEMPERATURE: 97.9 F

## 2021-02-04 PROCEDURE — 99024 POSTOP FOLLOW-UP VISIT: CPT

## 2021-03-05 ENCOUNTER — APPOINTMENT (OUTPATIENT)
Dept: ORTHOPEDIC SURGERY | Facility: CLINIC | Age: 56
End: 2021-03-05
Payer: COMMERCIAL

## 2021-03-05 VITALS — TEMPERATURE: 96.1 F

## 2021-03-05 PROCEDURE — 99024 POSTOP FOLLOW-UP VISIT: CPT

## 2021-04-12 ENCOUNTER — APPOINTMENT (OUTPATIENT)
Dept: ORTHOPEDIC SURGERY | Facility: CLINIC | Age: 56
End: 2021-04-12
Payer: COMMERCIAL

## 2021-04-12 PROCEDURE — 99024 POSTOP FOLLOW-UP VISIT: CPT

## 2021-04-12 NOTE — HISTORY OF PRESENT ILLNESS
[___ Weeks Post Op] : [unfilled] weeks post op [de-identified] : s/p Right shoulder arthroscopic rotator cuff repair. DOS: 01/19/2021 [de-identified] : Nixon is a pleasant 55-year-old male who returns the office today status post right shoulder arthroscopic rotator cuff repair, subacromial decompression, distal clavicle excision, and synovectomy, and biceps tenotomy on 1/19/2021.  Overall the patient feels well and has had improvement in his pain and swelling since his previous visit.  He has been nonweightbearing of his right upper extremity. He has been going to physical therapy which he feels is helping with his range of motion and strength.  He feels well today and is happy with his progress so far.  He reports no fevers, chills, sweats, numbness, tingling, weakness, redness, warmth, drainage, or pain elsewhere. [de-identified] : On exam, the patient is pleasant.  They  are awake, alert, and oriented x3.  The patient walks into my office without an antalgic gait.\par Patient is overweight for height\par Full range of motion of cervical spine without instability\par Full range of motion of back without instability\par Intact neurologic, vascular, and dermatologic exam to right and left upper extremities\par Intact neurologic, vascular, and dermatologic exam to right and left lower extremities\par \par Right upper Extremity\par The patient's portal incisions are well-healed at this point.  The patient has minimal tenderness to palpation about the shoulder.  Active hoda of motion: Forward flexion to 160, external rotation to 45, internal rotation to back pocket.  No strength testing was performed today. AIN/PIN/radial/ulnar/median/musculotendinous/axillary nerve motor function is intact, sensations intact light touch in C5-T1 distributions, radial pulses 2+, compartments are soft and compressible [de-identified] : 55-year-old male status post right shoulder arthroscopic rotator cuff repair, subacromial decompression, distal clavicle excision, synovectomy, and biceps tenotomy on 1/19/2021 [de-identified] : Overall Nixon is recovering well from his right shoulder arthroscopic rotator cuff repair.  At this point he may now begin to perform active motion in all planes.  He is advised not to lift anything shoulders greater than 1 to 3 pounds, particularly in abduction as this could overstress to the repair.  He will continue with physical therapy to help optimize his shoulder function.  He should avoid any heavy lifting with the arm.  Recommend follow-up in 3 months for repeat clinical evaluation.  The patient verbalizes understanding agrees with the plan.  All questions were answered to his satisfaction.

## 2021-07-12 ENCOUNTER — APPOINTMENT (OUTPATIENT)
Dept: ORTHOPEDIC SURGERY | Facility: CLINIC | Age: 56
End: 2021-07-12
Payer: COMMERCIAL

## 2021-07-12 VITALS
HEIGHT: 74 IN | BODY MASS INDEX: 40.43 KG/M2 | DIASTOLIC BLOOD PRESSURE: 86 MMHG | WEIGHT: 315 LBS | SYSTOLIC BLOOD PRESSURE: 134 MMHG | HEART RATE: 81 BPM

## 2021-07-12 DIAGNOSIS — M25.511 PAIN IN RIGHT SHOULDER: ICD-10-CM

## 2021-07-12 PROCEDURE — 99213 OFFICE O/P EST LOW 20 MIN: CPT

## 2021-07-12 PROCEDURE — 99072 ADDL SUPL MATRL&STAF TM PHE: CPT

## 2022-01-13 ENCOUNTER — APPOINTMENT (OUTPATIENT)
Dept: ORTHOPEDIC SURGERY | Facility: CLINIC | Age: 57
End: 2022-01-13

## 2022-02-17 ENCOUNTER — APPOINTMENT (OUTPATIENT)
Dept: NEUROLOGY | Facility: CLINIC | Age: 57
End: 2022-02-17

## 2022-04-27 ENCOUNTER — APPOINTMENT (OUTPATIENT)
Dept: NEUROLOGY | Facility: CLINIC | Age: 57
End: 2022-04-27

## 2022-12-09 ENCOUNTER — EMERGENCY (EMERGENCY)
Facility: HOSPITAL | Age: 57
LOS: 1 days | Discharge: HOME | End: 2022-12-09
Attending: EMERGENCY MEDICINE | Admitting: EMERGENCY MEDICINE
Payer: COMMERCIAL

## 2022-12-09 VITALS
SYSTOLIC BLOOD PRESSURE: 134 MMHG | TEMPERATURE: 98 F | RESPIRATION RATE: 20 BRPM | WEIGHT: 313.06 LBS | HEIGHT: 74 IN | DIASTOLIC BLOOD PRESSURE: 82 MMHG | OXYGEN SATURATION: 96 % | HEART RATE: 100 BPM

## 2022-12-09 LAB
GRAM STN FLD: SIGNIFICANT CHANGE UP
SPECIMEN SOURCE: SIGNIFICANT CHANGE UP

## 2022-12-09 PROCEDURE — 87070 CULTURE OTHR SPECIMN AEROBIC: CPT

## 2022-12-09 PROCEDURE — 99283 EMERGENCY DEPT VISIT LOW MDM: CPT | Mod: 25

## 2022-12-09 PROCEDURE — 20605 DRAIN/INJ JOINT/BURSA W/O US: CPT

## 2022-12-09 PROCEDURE — 99284 EMERGENCY DEPT VISIT MOD MDM: CPT | Mod: 25

## 2022-12-09 PROCEDURE — 87186 SC STD MICRODIL/AGAR DIL: CPT

## 2022-12-09 PROCEDURE — 87075 CULTR BACTERIA EXCEPT BLOOD: CPT

## 2022-12-09 PROCEDURE — 87205 SMEAR GRAM STAIN: CPT

## 2022-12-09 RX ORDER — BUPROPION HYDROCHLORIDE 150 MG/1
0 TABLET, EXTENDED RELEASE ORAL
Qty: 0 | Refills: 0 | DISCHARGE

## 2022-12-09 RX ORDER — ERTUGLIFLOZIN AND METFORMIN HYDROCHLORIDE 7.5; 1 MG/1; MG/1
0 TABLET, FILM COATED ORAL
Qty: 0 | Refills: 0 | DISCHARGE

## 2022-12-09 RX ORDER — LOSARTAN POTASSIUM 100 MG/1
1 TABLET, FILM COATED ORAL
Qty: 0 | Refills: 0 | DISCHARGE

## 2022-12-09 RX ADMIN — Medication 100 MILLIGRAM(S): at 01:30

## 2022-12-09 NOTE — ED PROVIDER NOTE - OBJECTIVE STATEMENT
Patient presents with redness and swelling and pain of the left elbow for a day.  Patient states he has had this before and was diagnosed as bursitis last time.  Patient has rash on his elbows to which he applies cream at times.  No other injury.  No fever.  No other joint pains.

## 2022-12-09 NOTE — ED ADULT NURSE NOTE - OBJECTIVE STATEMENT
Pt presents to the ED with reports of left elbow pain which began this evening. Pt states pain is worse with movement, has some redness, swelling and warmth at the elbow. Of note pt has psoriasis on both elbows. Pt denies any fever or chills, has reported h/o bursitis in his elbow.

## 2022-12-09 NOTE — ED PROVIDER NOTE - CLINICAL SUMMARY MEDICAL DECISION MAKING FREE TEXT BOX
Patient with left olecranon bursitis, clinically infected. Will drain if possible, start antibiotics and patient has ortho for follow up

## 2022-12-09 NOTE — ED PROVIDER NOTE - NSFOLLOWUPINSTRUCTIONS_ED_ALL_ED_FT
Elbow Bursitis       Bursitis is swelling and pain at the tip of the elbow. This happens when fluid builds up in a sac under the skin (bursa). This may also be called olecranon bursitis.      What are the causes?    Elbow bursitis may be caused by:  •Elbow injury, such as falling onto the elbow.      •Leaning on hard surfaces for long periods of time.      •Infection from an injury that breaks the skin near the elbow.      •A bone growth (spur) that forms at the tip of the elbow.      •A medical condition that causes inflammation, such as gout or rheumatoid arthritis.      Sometimes the cause is not known.      What are the signs or symptoms?    The first sign of elbow bursitis is usually swelling at the tip of the elbow. This can grow to be about the size of a golf ball. Swelling may start suddenly or develop gradually. Other symptoms may include:  •Pain when bending or leaning on the elbow.      •Not being able to move the elbow normally.      If bursitis is caused by an infection, you may have:  •Redness, warmth, and tenderness of the elbow.      •Drainage of pus from the swollen area over the elbow, if the skin breaks open.        How is this diagnosed?    This condition may be diagnosed based on:  •Your symptoms and medical history.      •Any recent injuries you have had.      •A physical exam.      •X-rays to check for a bone spur or fracture.      •Draining fluid from the bursa to test it for infection.      •Blood tests to rule out gout or rheumatoid arthritis.        How is this treated?    Treatment for elbow bursitis depends on the cause. Treatment may include:•Medicines. These may include:  •Over-the-counter medicines to relieve pain and inflammation.      •Antibiotic medicines.      •Injections of anti-inflammatory medicines (steroids).        •Draining fluid from the bursa.      •Wrapping your elbow with a bandage.      •Wearing elbow pads.      If these treatments do not help, you may need surgery to remove the bursa.      Follow these instructions at home:    Medicines     •Take over-the-counter and prescription medicines only as told by your health care provider.      •If you were prescribed an antibiotic medicine, take it as told by your health care provider. Do not stop taking the antibiotic even if you start to feel better.        Managing pain, stiffness, and swelling    •If directed, put ice on your elbow:  •Put ice in a plastic bag.      •Place a towel between your skin and the bag.      •Leave the ice on for 20 minutes, 2–3 times a day.        •If your bursitis is caused by an injury, rest your elbow and wear your bandage as told by your health care provider.      •Use elbow pads or elbow wraps to cushion your elbow as needed.      General instructions     •Avoid any activities that cause elbow pain. Ask your health care provider what activities are safe for you.      •Keep all follow-up visits as told by your health care provider. This is important.        Contact a health care provider if you have:    •A fever.      •Symptoms that do not get better with treatment.    •Pain or swelling that:  •Gets worse.      •Goes away and then comes back.        •Pus draining from your elbow.        Get help right away if you have:    •Trouble moving your arm, hand, or fingers.        Summary    •Elbow bursitis is inflammation of the fluid-filled sac (bursa) between the tip of your elbow bone (olecranon) and your skin.      •Treatment for elbow bursitis depends on the cause. It may include medicines to relieve pain and inflammation, antibiotic medicines, and draining fluid from your elbow.      •Contact a health care provider if your symptoms do not get better with treatment, or if your symptoms go away and then come back.      This information is not intended to replace advice given to you by your health care provider. Make sure you discuss any questions you have with your health care provider.

## 2022-12-09 NOTE — ED ADULT NURSE NOTE - NSICDXPASTMEDICALHX_GEN_ALL_CORE_FT
PAST MEDICAL HISTORY:  Diabetes on medication follow upo with PCP    H/O bursitis     HTN (hypertension) on medication and B/P ic controled B/P 112/80    Obstructive Sleep Apnea not compliant with CPAP

## 2022-12-09 NOTE — ED ADULT TRIAGE NOTE - TEMPERATURE IN CELSIUS (DEGREES C)
10/26/17 1000   Provider Notification   Provider Name/Title G 3 resident    Method of Notification Electronic Page   Request Evaluate-Remote   Notification Reason Other   insulin drip stopped per orders.    36.7

## 2022-12-09 NOTE — ED PROVIDER NOTE - PATIENT PORTAL LINK FT
You can access the FollowMyHealth Patient Portal offered by [facility] by registering at the following website: [email or web address]. By joining [employer]’s FollowMyHealth portal, you will also be able to view your health information using other applications (apps) compatible with our system.

## 2022-12-12 PROBLEM — Z87.39 PERSONAL HISTORY OF OTHER DISEASES OF THE MUSCULOSKELETAL SYSTEM AND CONNECTIVE TISSUE: Chronic | Status: ACTIVE | Noted: 2022-12-09

## 2022-12-13 ENCOUNTER — APPOINTMENT (OUTPATIENT)
Dept: ORTHOPEDIC SURGERY | Facility: CLINIC | Age: 57
End: 2022-12-13

## 2022-12-13 VITALS
DIASTOLIC BLOOD PRESSURE: 90 MMHG | HEART RATE: 92 BPM | HEIGHT: 74 IN | WEIGHT: 315 LBS | BODY MASS INDEX: 40.43 KG/M2 | SYSTOLIC BLOOD PRESSURE: 147 MMHG

## 2022-12-13 DIAGNOSIS — M70.20 OLECRANON BURSITIS, UNSPECIFIED ELBOW: ICD-10-CM

## 2022-12-13 LAB
-  AMPICILLIN/SULBACTAM: SIGNIFICANT CHANGE UP
-  CEFAZOLIN: SIGNIFICANT CHANGE UP
-  CLINDAMYCIN: SIGNIFICANT CHANGE UP
-  ERYTHROMYCIN: SIGNIFICANT CHANGE UP
-  GENTAMICIN: SIGNIFICANT CHANGE UP
-  OXACILLIN: SIGNIFICANT CHANGE UP
-  PENICILLIN: SIGNIFICANT CHANGE UP
-  RIFAMPIN: SIGNIFICANT CHANGE UP
-  TETRACYCLINE: SIGNIFICANT CHANGE UP
-  TRIMETHOPRIM/SULFAMETHOXAZOLE: SIGNIFICANT CHANGE UP
-  VANCOMYCIN: SIGNIFICANT CHANGE UP
METHOD TYPE: SIGNIFICANT CHANGE UP

## 2022-12-13 PROCEDURE — 99213 OFFICE O/P EST LOW 20 MIN: CPT

## 2022-12-13 NOTE — DISCUSSION/SUMMARY
[de-identified] : Assessment: 57-year-old male with resolving left elbow olecranon bursitis \par \par Plan: I had a long discussion with the patient today regarding the nature of their diagnosis and treatment plan. We discussed the risks and benefits of no treatment as well as nonoperative and operative treatments.  I reviewed the patient's x-rays today with him in the office which demonstrate early arthritic changes in the absence of any acute pathology.  On examination he does have a psoriatic plaque about the posterior aspect of the elbow with mild bursal swelling.  There are no signs or symptoms of infection.  At this time I recommend conservative treatment of the patient's condition with modalities including rest, ice, heat, anti-inflammatory medications, activity modifications, and home stretching and strengthening exercises daily. I discussed with the patient the risks and benefits associated with NSAID use.  The patient symptomatically has substantially improved and he may gradually return to his normal activities as tolerated.  Should he develop any signs or symptoms of infection he will go to the emergency department or call me for further assessment.\par \par The patient verbalizes their understanding and agrees with the plan.  All questions were answered to their satisfaction.\par \par

## 2022-12-13 NOTE — PHYSICAL EXAM
[de-identified] : General:\par Awake, alert, no acute distress, Patient was cooperative and appropriate during the examination.\par \par The patient is of normal weight for height and age.\par \par Walks without an antalgic gait.\par \par Full, painless range of motion of the neck and back.\par \par Exam of the bilateral lower extremities is intact and symmetric with regards to dermatologic, vascular, and neurologic exam. Bilateral lower extremity sensation is grossly intact to light touch in the DP/SP/T/S/S nerve distributions. Intact DF/PF/EHL. BIlateral lower extremities warm and well-perfused with brisk capillary refill.\par \par \par Pulmonary:\par Regular, nonlabored breathing\par \par Abdomen:\par Soft, nontender, nondistended.\par \par Lymphatic:\par No evidence of axillary lymphadenopathy\par \par Left Elbow Exam:\par Physical exam of the elbow demonstrates normal skin without signs of skin changes or abnormalities. No erythema, warmth, or joint effusion appreciated. \par  \par Sensation intact light touch C5-T1\par Palpable radial pulse\par Radial/ulnar/median/axillary/musculocutaneous/AIN/PIN nerves grossly intact\par  \par Range of motion:\par Flexion-Extension 0-140\par Pronation-Supination 85-85\par  \par Palpation:\par Not tender to palpation over the lateral epicondyle\par Not tender palpation over the medial epicondyle\par Not tender to palpation over the radial head\par Not tender to palpation over the olecranon\par Not tender to palpation over the distal biceps insertion\par Not tender to palpation over the distal triceps insertion\par Not tender to palpation over the cubital tunnel\par  \par Strength testing:\par Elbow Flexion 5/5\par Elbow Extension 5/5\par Pronation 5/5\par Supination 5/5\par  \par Special Tests:\par No varus/valgus laxity at 0 and 30 degrees of elbow flexion\par No pain with resisted wrist/finger extension and forearm supination\par No pain with resisted wrist/finger flexion and forearm pronation\par Negative hook test\par Negative Tinel's over the carpal and cubital tunnels\par   [de-identified] : X-ray 3 views of the left elbow taken in the office today on 12/13/2022 shows early arthritic changes with no other acute findings.

## 2022-12-13 NOTE — HISTORY OF PRESENT ILLNESS
[de-identified] : 12/13/2022 : GREGORIO GOMEZ  is a 57 year  old male who presents to the office for evaluation of his left elbow.  He states that a couple weeks ago he noticed swelling over the posterior aspect of his elbow.  He went to the ER and they aspirated the elbow and got only a few drops.  He states they sent him home on doxycycline which she is still taking and he states the pain and swelling has gotten better with ice and rest and antibiotics.  He states he has minimal discomfort now and little to no swelling.  The patient also has a history of psoriasis and has an active plaque on the posterior aspect of the left elbow.  He denies any numbness or tingling distally.  He denies any fevers, chills, chest pain, shortness of breath.

## 2022-12-13 NOTE — REASON FOR VISIT
[Follow-Up Visit] : a follow-up visit for [FreeTextEntry2] : Left elbow pain s/p Right shoulder arthroscopic rotator cuff repair. DOS: 01/19/2021.

## 2022-12-13 NOTE — ED ADULT NURSE NOTE - CARDIO ASSESSMENT
"Transitions-of-Care ([facility]) Pharmacy Future COST Assessment:     /Nurse requesting test claim: [facility]    Pharmacy ran test claim for the following:     Drug Sig Covered/[place] required Patient Copay per month   Xarelto (rivaroxaban)     Eliquis (apixaban) 20mg qd    5mg bid Covered without [place]     [place] Required $ 121.68     Unknown     Patient Insurance Type: Medicare - this means they are NOT eligible for a monthly discount card in the future (see \"free month\" note below)    Deductible/Medicare Gap Issue? Yes - this means the price may change in the future of this calendar year.  Our test claim did not provide information about the specific price at this time.    Is patient signed up for M2B service? No    Is above drug(s) eligible for 1 month free through M2B service? Yes - free coupon is available   If eligible,  or [facility] pharmacy can provide coupon upon request.           For billing questions, reach out to [facility] at [phone]  For M2B questions, reach out to [facility] at [phone]    Misha Beasley, PharmD   12/13/2022 12:49 EST    "
WDL

## 2022-12-14 LAB
CULTURE RESULTS: SIGNIFICANT CHANGE UP
ORGANISM # SPEC MICROSCOPIC CNT: SIGNIFICANT CHANGE UP
ORGANISM # SPEC MICROSCOPIC CNT: SIGNIFICANT CHANGE UP
SPECIMEN SOURCE: SIGNIFICANT CHANGE UP

## 2023-04-11 NOTE — ED ADULT TRIAGE NOTE - INTERNATIONAL TRAVEL
No Silver Nitrate Text: The wound bed was treated with silver nitrate after the biopsy was performed.

## 2024-03-26 ENCOUNTER — EMERGENCY (EMERGENCY)
Facility: HOSPITAL | Age: 59
LOS: 1 days | Discharge: HOME | End: 2024-03-26
Attending: INTERNAL MEDICINE | Admitting: INTERNAL MEDICINE
Payer: COMMERCIAL

## 2024-03-26 VITALS
RESPIRATION RATE: 18 BRPM | TEMPERATURE: 98 F | SYSTOLIC BLOOD PRESSURE: 146 MMHG | OXYGEN SATURATION: 98 % | HEART RATE: 84 BPM | WEIGHT: 315 LBS | DIASTOLIC BLOOD PRESSURE: 93 MMHG | HEIGHT: 74 IN

## 2024-03-26 PROCEDURE — 72220 X-RAY EXAM SACRUM TAILBONE: CPT | Mod: 26

## 2024-03-26 PROCEDURE — 72170 X-RAY EXAM OF PELVIS: CPT

## 2024-03-26 PROCEDURE — 99284 EMERGENCY DEPT VISIT MOD MDM: CPT

## 2024-03-26 PROCEDURE — 72170 X-RAY EXAM OF PELVIS: CPT | Mod: 26

## 2024-03-26 PROCEDURE — 72100 X-RAY EXAM L-S SPINE 2/3 VWS: CPT | Mod: 26

## 2024-03-26 PROCEDURE — 99053 MED SERV 10PM-8AM 24 HR FAC: CPT

## 2024-03-26 PROCEDURE — 72100 X-RAY EXAM L-S SPINE 2/3 VWS: CPT

## 2024-03-26 PROCEDURE — 72220 X-RAY EXAM SACRUM TAILBONE: CPT

## 2024-03-26 RX ORDER — METFORMIN HYDROCHLORIDE 850 MG/1
1 TABLET ORAL
Refills: 0 | DISCHARGE

## 2024-03-26 RX ORDER — CYCLOBENZAPRINE HYDROCHLORIDE 10 MG/1
1 TABLET, FILM COATED ORAL
Qty: 21 | Refills: 0
Start: 2024-03-26 | End: 2024-04-01

## 2024-03-26 RX ORDER — OXYCODONE AND ACETAMINOPHEN 5; 325 MG/1; MG/1
1 TABLET ORAL
Qty: 8 | Refills: 0
Start: 2024-03-26 | End: 2024-03-27

## 2024-03-26 RX ORDER — SITAGLIPTIN 50 MG/1
1 TABLET, FILM COATED ORAL
Refills: 0 | DISCHARGE

## 2024-03-26 NOTE — ED PROVIDER NOTE - OBJECTIVE STATEMENT
59 y/o male C/C  lumbar pain, sacral coccyx pain ,  slip and fall while at work onto his buttocks, no headache, no chest pain, no SOB, no palpitations, no n/v, no neuro changes.

## 2024-03-26 NOTE — ED PROVIDER NOTE - CONSTITUTIONAL, MLM
Well appearing, awake, alert, oriented to person, place, time/situation and in moderates  distress. normal...

## 2024-03-26 NOTE — ED ADULT NURSE NOTE - NSFALLRISKINTERV_ED_ALL_ED

## 2024-03-26 NOTE — ED PROVIDER NOTE - CARE PROVIDER_API CALL
Timbo Delacruz  Orthopaedic Surgery  [address], [address]  [place] [address]  Phone: [phone]  Fax: [phone]  Follow Up Time: 1-3 Days

## 2024-03-26 NOTE — ED PROVIDER NOTE - CLINICAL SUMMARY MEDICAL DECISION MAKING FREE TEXT BOX
59 y/o male C/C  lumbar pain, sacral coccyx pain ,  slip and fall while at work onto his buttocks, no headache, no chest pain, no SOB, no palpitations, no n/v, no neuro changes. VSS , X rays are non acute , Treated with percocet,  discharged on flexeril and percocet . Orthopedic referral provided

## 2024-03-26 NOTE — ED PROVIDER NOTE - CARE PROVIDERS DIRECT ADDRESSES
[email or web address] Mirvaso Counseling: Mirvaso is a topical medication which can decrease superficial blood flow where applied. Side effects are uncommon and include stinging, redness and allergic reactions.

## 2024-03-26 NOTE — ED PROVIDER NOTE - PATIENT PORTAL LINK FT
You can access the FollowMyHealth Patient Portal offered by [facility] by registering at the following website: [email or web address]. By joining [employer] [facility] [employer]’s FollowMyHealth portal, you will also be able to view your health information using other applications (apps) compatible with our system.